# Patient Record
Sex: MALE | Race: WHITE | Employment: OTHER | URBAN - METROPOLITAN AREA
[De-identification: names, ages, dates, MRNs, and addresses within clinical notes are randomized per-mention and may not be internally consistent; named-entity substitution may affect disease eponyms.]

---

## 2021-01-18 ENCOUNTER — HOSPITAL ENCOUNTER (OUTPATIENT)
Dept: ULTRASOUND IMAGING | Age: 66
Discharge: HOME | End: 2021-01-18
Payer: MEDICARE

## 2021-01-18 ENCOUNTER — HOSPITAL ENCOUNTER (OUTPATIENT)
Age: 66
Discharge: HOME | End: 2021-01-18

## 2021-01-18 DIAGNOSIS — Z87.891 HISTORY OF TOBACCO USE: ICD-10-CM

## 2021-01-18 PROCEDURE — 76706 US ABDL AORTA SCREEN AAA: CPT

## 2023-02-02 ENCOUNTER — OFFICE VISIT (OUTPATIENT)
Dept: ENT CLINIC | Age: 68
End: 2023-02-02
Payer: MEDICARE

## 2023-02-02 VITALS
TEMPERATURE: 97.9 F | BODY MASS INDEX: 31.5 KG/M2 | RESPIRATION RATE: 16 BRPM | SYSTOLIC BLOOD PRESSURE: 142 MMHG | HEART RATE: 74 BPM | WEIGHT: 225 LBS | DIASTOLIC BLOOD PRESSURE: 88 MMHG | HEIGHT: 71 IN

## 2023-02-02 DIAGNOSIS — R04.0 EPISTAXIS: Primary | ICD-10-CM

## 2023-02-02 DIAGNOSIS — J34.2 DEVIATED NASAL SEPTUM: ICD-10-CM

## 2023-02-02 PROCEDURE — 3017F COLORECTAL CA SCREEN DOC REV: CPT | Performed by: STUDENT IN AN ORGANIZED HEALTH CARE EDUCATION/TRAINING PROGRAM

## 2023-02-02 PROCEDURE — 99203 OFFICE O/P NEW LOW 30 MIN: CPT | Performed by: STUDENT IN AN ORGANIZED HEALTH CARE EDUCATION/TRAINING PROGRAM

## 2023-02-02 PROCEDURE — 1123F ACP DISCUSS/DSCN MKR DOCD: CPT | Performed by: STUDENT IN AN ORGANIZED HEALTH CARE EDUCATION/TRAINING PROGRAM

## 2023-02-02 PROCEDURE — 31238 NSL/SINS NDSC SRG NSL HEMRRG: CPT | Performed by: STUDENT IN AN ORGANIZED HEALTH CARE EDUCATION/TRAINING PROGRAM

## 2023-02-02 PROCEDURE — 1036F TOBACCO NON-USER: CPT | Performed by: STUDENT IN AN ORGANIZED HEALTH CARE EDUCATION/TRAINING PROGRAM

## 2023-02-02 PROCEDURE — G8427 DOCREV CUR MEDS BY ELIG CLIN: HCPCS | Performed by: STUDENT IN AN ORGANIZED HEALTH CARE EDUCATION/TRAINING PROGRAM

## 2023-02-02 PROCEDURE — G8417 CALC BMI ABV UP PARAM F/U: HCPCS | Performed by: STUDENT IN AN ORGANIZED HEALTH CARE EDUCATION/TRAINING PROGRAM

## 2023-02-02 PROCEDURE — G8484 FLU IMMUNIZE NO ADMIN: HCPCS | Performed by: STUDENT IN AN ORGANIZED HEALTH CARE EDUCATION/TRAINING PROGRAM

## 2023-02-02 RX ORDER — ALLOPURINOL 300 MG/1
TABLET ORAL
COMMUNITY
Start: 2022-12-27

## 2023-02-02 RX ORDER — HYDROXYCHLOROQUINE SULFATE 200 MG/1
TABLET, FILM COATED ORAL
COMMUNITY
Start: 2022-12-27

## 2023-02-02 RX ORDER — LOSARTAN POTASSIUM 50 MG/1
TABLET ORAL
COMMUNITY
Start: 2022-12-26

## 2023-02-02 ASSESSMENT — ENCOUNTER SYMPTOMS
SHORTNESS OF BREATH: 0
NAUSEA: 0
COUGH: 0
VOMITING: 0
EYE PAIN: 0
RHINORRHEA: 0

## 2023-02-02 NOTE — PROGRESS NOTES
[facility]      Patient Name: Jarred Berger  Medical Record Number:  [id number]  Primary Care Physician:  No primary care provider on file. Date of Consultation: 2023    Chief Complaint:   Chief Complaint   Patient presents with    New Patient     Patient is here today for epistaxis. Patient states that he has been getting nosebleeds since December. He states that his last bleed was last night. HISTORY OF PRESENT ILLNESS  Miranda Barfield is a(n) 75 y.o. male who presents for evaluation of epistaxis. He states that he has been having nosebleeds from the right side for 2 months. He has had his nose cauterized at least 4 times in the past.  He denies any other easy bleeding or bruising. He is not on any blood thinners. He did recently begin using Flonase which could be the instigating factor for his nosebleeds. He denies any other ENT symptoms. There is no problem list on file for this patient. Past Surgical History:   Procedure Laterality Date    TONSILLECTOMY       No family history on file.   Social History     Socioeconomic History    Marital status:      Spouse name: Not on file    Number of children: Not on file    Years of education: Not on file    Highest education level: Not on file   Occupational History    Not on file   Tobacco Use    Smoking status: Former     Packs/day: 1.00     Years: 35.00     Pack years: 35.00     Types: Cigarettes     Start date:      Quit date:      Years since quittin.1    Smokeless tobacco: Never   Vaping Use    Vaping Use: Never used   Substance and Sexual Activity    Alcohol use: Yes    Drug use: Never    Sexual activity: Not on file   Other Topics Concern    Not on file   Social History Narrative    Not on file     Social Determinants of Health     Financial Resource Strain: Not on file   Food Insecurity: Not on file   Transportation Needs: Not on file   Physical Activity: Not on file Stress: Not on file   Social Connections: Not on file   Intimate Partner Violence: Not on file   Housing Stability: Not on file       DRUG/FOOD ALLERGIES: Patient has no known allergies. CURRENT MEDICATIONS  Prior to Admission medications    Medication Sig Start Date End Date Taking? Authorizing Provider   allopurinol (ZYLOPRIM) 300 MG tablet TAKE 1 TABLET BY MOUTH EVERY DAY 12/27/22  Yes Historical Provider, MD   hydroxychloroquine (PLAQUENIL) 200 MG tablet TAKE 1 TABLET BY MOUTH TWICE DAILY 12/27/22  Yes Historical Provider, MD   losartan (COZAAR) 50 MG tablet TAKE 1 TABLET BY MOUTH TWICE DAILY 12/26/22  Yes Historical Provider, MD       REVIEW OF SYSTEMS  The following systems were reviewed and revealed the following in addition to any already discussed in the HPI:    Review of Systems   Constitutional:  Negative for fatigue and fever. HENT:  Positive for nosebleeds. Negative for congestion, ear pain, postnasal drip, rhinorrhea and sneezing. Eyes:  Negative for pain and visual disturbance. Respiratory:  Negative for cough and shortness of breath. Cardiovascular:  Negative for chest pain. Gastrointestinal:  Negative for nausea and vomiting. Endocrine: Negative. Genitourinary: Negative. Musculoskeletal:  Negative for neck pain and neck stiffness. Skin:  Negative for rash. Neurological:  Negative for dizziness and headaches.         PHYSICAL EXAM  BP (!) 142/88 (Site: Right Upper Arm, Position: Sitting, Cuff Size: Medium Adult)   Pulse 74   Temp 97.9 °F (36.6 °C) (Infrared)   Resp 16   Ht 5' 11\" (1.803 m)   Wt 225 lb (102.1 kg)   BMI 31.38 kg/m²     GENERAL: No Acute Distress, Alert and Oriented, no hoarseness  EYES: EOMI, Anti-icteric  NOSE: No epistaxis, nasal mucosa within normal limits, no purulent drainage, 3+ deviated septum to the left  EARS: Normal external canal appearance, EAC patent bilaterally  FACE: 1/6 House-Brackmann Scale, symmetric, sensation equal bilaterally  ORAL CAVITY: No masses or lesions palpated, uvula is midline, moist mucous membranes,   NECK: Normal range of motion, no thyromegaly, trachea is midline, no lymphadenopathy, no neck masses, no crepitus  CHEST: Normal respiratory effort, no retractions, breathing comfortably  SKIN: No rashes, normal appearing skin, no evidence of skin lesions/tumors    RADIOLOGY  Summary of findings:    PROCEDURE  Control of Epistaxis    Pre Op: right sided Epistaxis  Post Op: Same    After consent was obtained a cottonoid soaked with lidocaine was placed in the right nasal cavity. After 5 minutes cautery was performed of the right mid septum. Nasal endoscopy was needed to perform the control of bleeding. The bleeding site was not posterior. Hemostasis was obtained. The patient tolerated well. No complications. I attest that I was present for and did the entire procedure myself. ASSESSMENT/PLAN  Masoud Aviles is a very pleasant 75 y.o. male with     1. Epistaxis  Epistaxis has been controlled with silver nitrate and Surgicel placed over the cauterization area. He will place triple antibiotic ointment to the anterior portion of his nose for the next week and use nasal saline spray  - HI NASAL/SINUS NDSC SURG W/CONTROL NASAL HEMRRG    2. Deviated nasal septum  He does have a significantly deviated septum however he is not having issues with breathing so we will observe this is point time. He will follow-up in 2 to 3 weeks or sooner if needed. Medical Decision Making:   The following items were considered in medical decision making:  Independent review of images  Review / order clinical lab tests  Review / order radiology tests  Decision to obtain old records

## 2023-02-14 ENCOUNTER — HOSPITAL ENCOUNTER (OUTPATIENT)
Dept: ULTRASOUND IMAGING | Age: 68
Discharge: HOME | End: 2023-02-14
Payer: MEDICARE

## 2023-02-14 DIAGNOSIS — Z87.891 PERSONAL HISTORY OF TOBACCO USE, PRESENTING HAZARDS TO HEALTH: ICD-10-CM

## 2023-02-14 PROCEDURE — 76706 US ABDL AORTA SCREEN AAA: CPT

## 2023-02-16 ENCOUNTER — OFFICE VISIT (OUTPATIENT)
Dept: ENT CLINIC | Age: 68
End: 2023-02-16
Payer: MEDICARE

## 2023-02-16 VITALS — RESPIRATION RATE: 16 BRPM | WEIGHT: 225 LBS | BODY MASS INDEX: 31.5 KG/M2 | TEMPERATURE: 97.7 F | HEIGHT: 71 IN

## 2023-02-16 DIAGNOSIS — R04.0 EPISTAXIS: Primary | ICD-10-CM

## 2023-02-16 DIAGNOSIS — J34.2 DEVIATED NASAL SEPTUM: ICD-10-CM

## 2023-02-16 PROCEDURE — 1123F ACP DISCUSS/DSCN MKR DOCD: CPT | Performed by: STUDENT IN AN ORGANIZED HEALTH CARE EDUCATION/TRAINING PROGRAM

## 2023-02-16 PROCEDURE — 99213 OFFICE O/P EST LOW 20 MIN: CPT | Performed by: STUDENT IN AN ORGANIZED HEALTH CARE EDUCATION/TRAINING PROGRAM

## 2023-02-16 PROCEDURE — G8484 FLU IMMUNIZE NO ADMIN: HCPCS | Performed by: STUDENT IN AN ORGANIZED HEALTH CARE EDUCATION/TRAINING PROGRAM

## 2023-02-16 PROCEDURE — G8417 CALC BMI ABV UP PARAM F/U: HCPCS | Performed by: STUDENT IN AN ORGANIZED HEALTH CARE EDUCATION/TRAINING PROGRAM

## 2023-02-16 PROCEDURE — G8427 DOCREV CUR MEDS BY ELIG CLIN: HCPCS | Performed by: STUDENT IN AN ORGANIZED HEALTH CARE EDUCATION/TRAINING PROGRAM

## 2023-02-16 PROCEDURE — 3017F COLORECTAL CA SCREEN DOC REV: CPT | Performed by: STUDENT IN AN ORGANIZED HEALTH CARE EDUCATION/TRAINING PROGRAM

## 2023-02-16 PROCEDURE — 1036F TOBACCO NON-USER: CPT | Performed by: STUDENT IN AN ORGANIZED HEALTH CARE EDUCATION/TRAINING PROGRAM

## 2023-02-16 ASSESSMENT — ENCOUNTER SYMPTOMS
SHORTNESS OF BREATH: 0
VOMITING: 0
EYE PAIN: 0
NAUSEA: 0
COUGH: 0
RHINORRHEA: 0

## 2023-02-16 NOTE — PROGRESS NOTES
[facility]  DIVISION OF OTOLARYNGOLOGY- HEAD & NECK SURGERY  CONSULT      Patient Name: Jay Starr  Medical Record Number:  [id number]  Primary Care Physician:  No primary care provider on file.  Date of Consultation: 2023    Chief Complaint:   Chief Complaint   Patient presents with    Follow-up     States that his nose has been fine since last appointment        HISTORY OF PRESENT ILLNESS  Jay is a(n) 67 y.o. male who presents for evaluation of epistaxis.  He states that he has been having nosebleeds from the right side for 2 months.  He has had his nose cauterized at least 4 times in the past.  He denies any other easy bleeding or bruising.  He is not on any blood thinners.  He did recently begin using Flonase which could be the instigating factor for his nosebleeds.  He denies any other ENT symptoms.    Interval history 2023  Jay has not had any further bleeding since cauterization was performed last week.  He has been doing well.    There is no problem list on file for this patient.    Past Surgical History:   Procedure Laterality Date    TONSILLECTOMY       No family history on file.  Social History     Socioeconomic History    Marital status:      Spouse name: Not on file    Number of children: Not on file    Years of education: Not on file    Highest education level: Not on file   Occupational History    Not on file   Tobacco Use    Smoking status: Former     Packs/day: 1.00     Years: 35.00     Pack years: 35.00     Types: Cigarettes     Start date:      Quit date:      Years since quittin.1    Smokeless tobacco: Never   Vaping Use    Vaping Use: Never used   Substance and Sexual Activity    Alcohol use: Yes    Drug use: Never    Sexual activity: Not on file   Other Topics Concern    Not on file   Social History Narrative    Not on file     Social Determinants of Health     Financial Resource Strain: Not on file   Food Insecurity: Not on file   Transportation Needs: Not  on file   Physical Activity: Not on file   Stress: Not on file   Social Connections: Not on file   Intimate Partner Violence: Not on file   Housing Stability: Not on file     DRUG/FOOD ALLERGIES: Patient has no known allergies. CURRENT MEDICATIONS  Prior to Admission medications    Medication Sig Start Date End Date Taking? Authorizing Provider   allopurinol (ZYLOPRIM) 300 MG tablet TAKE 1 TABLET BY MOUTH EVERY DAY 12/27/22  Yes Historical Provider, MD   hydroxychloroquine (PLAQUENIL) 200 MG tablet TAKE 1 TABLET BY MOUTH TWICE DAILY 12/27/22  Yes Historical Provider, MD   losartan (COZAAR) 50 MG tablet TAKE 1 TABLET BY MOUTH TWICE DAILY 12/26/22  Yes Historical Provider, MD     REVIEW OF SYSTEMS  The following systems were reviewed and revealed the following in addition to any already discussed in the HPI:    Review of Systems   Constitutional:  Negative for fatigue and fever. HENT:  Negative for congestion, ear pain, nosebleeds, postnasal drip, rhinorrhea and sneezing. Eyes:  Negative for pain and visual disturbance. Respiratory:  Negative for cough and shortness of breath. Cardiovascular:  Negative for chest pain. Gastrointestinal:  Negative for nausea and vomiting. Endocrine: Negative. Genitourinary: Negative. Musculoskeletal:  Negative for neck pain and neck stiffness. Skin:  Negative for rash. Neurological:  Negative for dizziness and headaches.       PHYSICAL EXAM  Temp 97.7 °F (36.5 °C) (Infrared)   Resp 16   Ht 5' 11\" (1.803 m)   Wt 225 lb (102.1 kg)   BMI 31.38 kg/m²     GENERAL: No Acute Distress, Alert and Oriented, no hoarseness  EYES: EOMI, Anti-icteric  NOSE: No epistaxis, nasal mucosa within normal limits, no purulent drainage, 3+ deviated septum to the left  EARS: Normal external canal appearance, EAC patent bilaterally  FACE: 1/6 House-Brackmann Scale, symmetric, sensation equal bilaterally  ORAL CAVITY: No masses or lesions palpated, uvula is midline, moist mucous membranes,   NECK: Normal range of motion, no thyromegaly, trachea is midline, no lymphadenopathy, no neck masses, no crepitus  CHEST: Normal respiratory effort, no retractions, breathing comfortably  SKIN: No rashes, normal appearing skin, no evidence of skin lesions/tumors    RADIOLOGY  Summary of findings:    PROCEDURE    ASSESSMENT/PLAN  Jared Dela Cruz is a very pleasant 75 y.o. male with     1. Epistaxis  Epistaxis has resolved. He will start placing hydration to the nose especially during the winter months. The septum has healed well. 2. Deviated nasal septum  He does have a deviated nasal septum to the left. We could fix this surgically however it does not seem to bother him significantly. He will think about his options and let me know if he does change his mind. He will follow-up with me as needed. Medical Decision Making:   The following items were considered in medical decision making:  Independent review of images  Review / order clinical lab tests  Review / order radiology tests  Decision to obtain old records

## 2024-11-26 ENCOUNTER — TRANSCRIBE ORDERS (OUTPATIENT)
Dept: ADMINISTRATIVE | Age: 69
End: 2024-11-26

## 2024-11-26 DIAGNOSIS — R63.4 UNINTENTIONAL WEIGHT LOSS: Primary | ICD-10-CM

## 2024-11-27 ENCOUNTER — HOSPITAL ENCOUNTER (OUTPATIENT)
Dept: CT IMAGING | Age: 69
Discharge: HOME | End: 2024-11-27
Payer: MEDICARE

## 2024-11-27 DIAGNOSIS — R63.4 UNINTENTIONAL WEIGHT LOSS: ICD-10-CM

## 2024-11-27 LAB
PERFORMED ON: ABNORMAL
POC CREATININE: 0.7 MG/DL (ref 0.8–1.3)
POC SAMPLE TYPE: ABNORMAL

## 2024-11-27 PROCEDURE — 74177 CT ABD & PELVIS W/CONTRAST: CPT

## 2024-11-27 PROCEDURE — 6360000004 HC RX CONTRAST MEDICATION: Performed by: FAMILY MEDICINE

## 2024-11-27 PROCEDURE — 82565 ASSAY OF CREATININE: CPT

## 2024-11-27 RX ORDER — DIATRIZOATE MEGLUMINE AND DIATRIZOATE SODIUM 660; 100 MG/ML; MG/ML
12 SOLUTION ORAL; RECTAL
Status: DISCONTINUED | OUTPATIENT
Start: 2024-11-27 | End: 2024-11-28 | Stop reason: HOSPADM

## 2024-11-27 RX ORDER — IOPAMIDOL 755 MG/ML
75 INJECTION, SOLUTION INTRAVASCULAR
Status: COMPLETED | OUTPATIENT
Start: 2024-11-27 | End: 2024-11-27

## 2024-11-27 RX ADMIN — DIATRIZOATE MEGLUMINE AND DIATRIZOATE SODIUM 12 ML: 660; 100 LIQUID ORAL; RECTAL at 14:23

## 2024-11-27 RX ADMIN — IOPAMIDOL 75 ML: 755 INJECTION, SOLUTION INTRAVENOUS at 14:23

## 2024-12-03 ENCOUNTER — TELEPHONE (OUTPATIENT)
Dept: INTERVENTIONAL RADIOLOGY/VASCULAR | Age: 69
End: 2024-12-03

## 2024-12-03 NOTE — TELEPHONE ENCOUNTER
Attempted to call patient to schedule procedure. No answer left message for patient to call back. Number used [phone]    Procedure: liver biopsy lesion   Approving Radiologist: Peterson

## 2024-12-04 ENCOUNTER — TELEPHONE (OUTPATIENT)
Dept: INTERVENTIONAL RADIOLOGY/VASCULAR | Age: 69
End: 2024-12-04

## 2024-12-04 NOTE — TELEPHONE ENCOUNTER
Called and spoke to Ludivina about upcoming procedure. Phone number used: [phone]  Procedure: liver biopsy  Approving Radiologist: Peterson Sparks informed of the following:  Date of procedure: 12/11/24  Arrival time of procedure: 0830  Time of procedure: 02072    On any blood thinners?No. If yes:   Instructed to hold thinners for  days prior to test    On any GLP-1 Agonists? ( Ozempic, Jardiance, Semaglutide) No. Instructed to hold for  days.     Blood pressure medication? Yes. If yes need to make sure take morning of procedure.     Any issues with sedation in the past? no  Will receive versed and fentanyl for sedation will need a  day of procedure.     H&P or office note within 30 days? yes - 12/3/24    After procedure will be here 2-4 hours after procedure for monitoring.     Nothing to eat or drink at midnight.     Need COVID testing prior: No    Need SDS: Yes

## 2024-12-11 ENCOUNTER — HOSPITAL ENCOUNTER (OUTPATIENT)
Dept: CT IMAGING | Age: 69
Discharge: HOME | End: 2024-12-11
Attending: INTERNAL MEDICINE
Payer: MEDICARE

## 2024-12-11 VITALS
SYSTOLIC BLOOD PRESSURE: 155 MMHG | OXYGEN SATURATION: 96 % | DIASTOLIC BLOOD PRESSURE: 78 MMHG | HEART RATE: 74 BPM | TEMPERATURE: 98.1 F | RESPIRATION RATE: 16 BRPM

## 2024-12-11 DIAGNOSIS — K76.9 LIVER LESION: ICD-10-CM

## 2024-12-11 LAB
ANION GAP SERPL CALCULATED.3IONS-SCNC: 13 MMOL/L (ref 3–16)
BUN SERPL-MCNC: 20 MG/DL (ref 7–20)
CALCIUM SERPL-MCNC: 9.7 MG/DL (ref 8.3–10.6)
CHLORIDE SERPL-SCNC: 102 MMOL/L (ref 99–110)
CO2 SERPL-SCNC: 24 MMOL/L (ref 21–32)
CREAT SERPL-MCNC: 0.7 MG/DL (ref 0.8–1.3)
DEPRECATED RDW RBC AUTO: 16.5 % (ref 12.4–15.4)
GFR SERPLBLD CREATININE-BSD FMLA CKD-EPI: >90 ML/MIN/{1.73_M2}
GLUCOSE SERPL-MCNC: 106 MG/DL (ref 70–99)
HCT VFR BLD AUTO: 31.6 % (ref 40.5–52.5)
HGB BLD-MCNC: 10.3 G/DL (ref 13.5–17.5)
INR PPP: 1.12 (ref 0.85–1.15)
MCH RBC QN AUTO: 30.3 PG (ref 26–34)
MCHC RBC AUTO-ENTMCNC: 32.4 G/DL (ref 31–36)
MCV RBC AUTO: 93.5 FL (ref 80–100)
PLATELET # BLD AUTO: 504 K/UL (ref 135–450)
PMV BLD AUTO: 7.8 FL (ref 5–10.5)
POTASSIUM SERPL-SCNC: 5 MMOL/L (ref 3.5–5.1)
PROTHROMBIN TIME: 14.6 SEC (ref 11.9–14.9)
RBC # BLD AUTO: 3.38 M/UL (ref 4.2–5.9)
SODIUM SERPL-SCNC: 139 MMOL/L (ref 136–145)
WBC # BLD AUTO: 7.9 K/UL (ref 4–11)

## 2024-12-11 PROCEDURE — 88307 TISSUE EXAM BY PATHOLOGIST: CPT

## 2024-12-11 PROCEDURE — 36415 COLL VENOUS BLD VENIPUNCTURE: CPT

## 2024-12-11 PROCEDURE — 85027 COMPLETE CBC AUTOMATED: CPT

## 2024-12-11 PROCEDURE — 85610 PROTHROMBIN TIME: CPT

## 2024-12-11 PROCEDURE — 80048 BASIC METABOLIC PNL TOTAL CA: CPT

## 2024-12-11 PROCEDURE — 99153 MOD SED SAME PHYS/QHP EA: CPT

## 2024-12-11 PROCEDURE — 6360000002 HC RX W HCPCS: Performed by: RADIOLOGY

## 2024-12-11 PROCEDURE — 88341 IMHCHEM/IMCYTCHM EA ADD ANTB: CPT

## 2024-12-11 PROCEDURE — 88333 PATH CONSLTJ SURG CYTO XM 1: CPT

## 2024-12-11 PROCEDURE — 88313 SPECIAL STAINS GROUP 2: CPT

## 2024-12-11 PROCEDURE — 88334 PATH CONSLTJ SURG CYTO XM EA: CPT

## 2024-12-11 PROCEDURE — 88342 IMHCHEM/IMCYTCHM 1ST ANTB: CPT

## 2024-12-11 RX ORDER — SODIUM CHLORIDE 0.9 % (FLUSH) 0.9 %
5-40 SYRINGE (ML) INJECTION PRN
Status: DISCONTINUED | OUTPATIENT
Start: 2024-12-11 | End: 2024-12-12 | Stop reason: HOSPADM

## 2024-12-11 RX ORDER — SODIUM CHLORIDE 0.9 % (FLUSH) 0.9 %
5-40 SYRINGE (ML) INJECTION EVERY 12 HOURS SCHEDULED
Status: DISCONTINUED | OUTPATIENT
Start: 2024-12-11 | End: 2024-12-12 | Stop reason: HOSPADM

## 2024-12-11 RX ORDER — FENTANYL CITRATE 50 UG/ML
INJECTION, SOLUTION INTRAMUSCULAR; INTRAVENOUS PRN
Status: COMPLETED | OUTPATIENT
Start: 2024-12-11 | End: 2024-12-11

## 2024-12-11 RX ORDER — MIDAZOLAM HYDROCHLORIDE 1 MG/ML
INJECTION, SOLUTION INTRAMUSCULAR; INTRAVENOUS PRN
Status: COMPLETED | OUTPATIENT
Start: 2024-12-11 | End: 2024-12-11

## 2024-12-11 RX ORDER — ONDANSETRON 2 MG/ML
4 INJECTION INTRAMUSCULAR; INTRAVENOUS EVERY 8 HOURS PRN
Status: DISCONTINUED | OUTPATIENT
Start: 2024-12-11 | End: 2024-12-12 | Stop reason: HOSPADM

## 2024-12-11 RX ORDER — SODIUM CHLORIDE 9 MG/ML
INJECTION, SOLUTION INTRAVENOUS PRN
Status: DISCONTINUED | OUTPATIENT
Start: 2024-12-11 | End: 2024-12-12 | Stop reason: HOSPADM

## 2024-12-11 RX ADMIN — FENTANYL CITRATE 50 MCG: 50 INJECTION, SOLUTION INTRAMUSCULAR; INTRAVENOUS at 12:05

## 2024-12-11 RX ADMIN — MIDAZOLAM 1 MG: 1 INJECTION INTRAMUSCULAR; INTRAVENOUS at 12:05

## 2024-12-11 RX ADMIN — MIDAZOLAM 1 MG: 1 INJECTION INTRAMUSCULAR; INTRAVENOUS at 12:27

## 2024-12-11 RX ADMIN — FENTANYL CITRATE 50 MCG: 50 INJECTION, SOLUTION INTRAMUSCULAR; INTRAVENOUS at 12:27

## 2024-12-11 ASSESSMENT — PAIN SCALES - GENERAL: PAINLEVEL_OUTOF10: 0

## 2024-12-11 ASSESSMENT — PAIN - FUNCTIONAL ASSESSMENT: PAIN_FUNCTIONAL_ASSESSMENT: 0-10

## 2024-12-11 NOTE — OR NURSING
Image guided liver biopsy biopsy completed by Dr. Winkler. Pt tolerated procedure without any signs or symptoms of distress. Vital signs stable. Report given  to hospitals RN. Pt transported back to hospitals in stable condition via stretcher.     Total Biopsy: 5  Received: Versed: 2 mg       Fentanyl: 100 mcg  Bandage to right side that is clean dry and intact.   Patient to lay on right side for 1 hour.     Vital Signs  Vitals:    12/11/24 1243   BP:    Pulse: 78   Resp: 16   Temp:    SpO2: 96%    (vital signs in table format)    Post Robbie  2 - Able to move 4 extremities voluntarily on command  2 - BP+/- 20mmHg of normal  2 - Fully awake  2 - Able to maintain oxygen saturation >92% on room air  2 - Able to breathe deeply and cough freely

## 2024-12-11 NOTE — OR NURSING
Pt arrived for image guided liver biopsy right. Procedure explained including the risk and benefits of the procedure. All questions answered. Pt verbalizes understanding of the of procedure and states no more questions. Consent signed. Vital signs stable, labs, allergies, medications, and code status reviewed. No contraindications noted.     Vitals:    12/11/24 1025   BP: (!) 143/76   Pulse: 78   Resp: 18   Temp: 98.5 °F (36.9 °C)   SpO2: 97%    (vital signs in table format)    Robbie Score  2 - Able to move 4 extremities voluntarily on command  2 - BP+/- 20mmHg of normal  2 - Fully awake  2 - Able to maintain oxygen saturation >92% on room air  2 - Able to breathe deeply and cough freely    Allergies  Patient has no known allergies.    Labs  Lab Results   Component Value Date    INR 1.12 12/11/2024    PROTIME 14.6 12/11/2024       Lab Results   Component Value Date    CREATININE 0.7 (L) 12/11/2024    BUN 20 12/11/2024     12/11/2024    K 5.0 12/11/2024     12/11/2024    CO2 24 12/11/2024       Lab Results   Component Value Date    WBC 7.9 12/11/2024    HGB 10.3 (L) 12/11/2024    HCT 31.6 (L) 12/11/2024    MCV 93.5 12/11/2024     (H) 12/11/2024

## 2024-12-11 NOTE — PROGRESS NOTES
Discharge instructions given to patient and patient's wife, verbalized understanding. Patient tolerating PO intake. No pain, no nausea. No emesis. Handoff given to HERB Galindo.

## 2024-12-11 NOTE — PRE SEDATION
Sedation Pre-Procedure Note    Patient Name: Jay Starr   YOB: 1955  Room/Bed: Room/bed info not found  Medical Record Number: [id number]  Date: 12/11/2024   Time: 12:52 PM       Indication:  Liver Lesion Biopsy    Consent: I have discussed with the patient and/or the patient representative the indication, alternatives, and the possible risks and/or complications of the planned procedure and the anesthesia methods. The patient and/or patient representative appear to understand and agree to proceed.    Vital Signs:   Vitals:    12/11/24 1243   BP: (!) 151/70   Pulse: 78   Resp: 16   Temp:    SpO2: 96%       Past Medical History:   has a past medical history of Gout, Hypertension, and Nosebleed.    Past Surgical History:   has a past surgical history that includes Tonsillectomy.    Medications:   Scheduled Meds:    sodium chloride flush  5-40 mL IntraVENous 2 times per day     Continuous Infusions:    sodium chloride       PRN Meds: sodium chloride flush, sodium chloride, ondansetron  Home Meds:   Prior to Admission medications    Medication Sig Start Date End Date Taking? Authorizing Provider   allopurinol (ZYLOPRIM) 300 MG tablet TAKE 1 TABLET BY MOUTH EVERY DAY 12/27/22  Yes Karina Johnson MD   hydroxychloroquine (PLAQUENIL) 200 MG tablet TAKE 1 TABLET BY MOUTH TWICE DAILY 12/27/22  Yes Karina Johnson MD   losartan (COZAAR) 50 MG tablet TAKE 1 TABLET BY MOUTH TWICE DAILY 12/26/22  Yes Karina Johnson MD     Coumadin Use Last 7 Days:  no  Antiplatelet drug therapy use last 7 days: no  Other anticoagulant use last 7 days: no  Additional Medication Information:        Pre-Sedation Documentation and Exam:   I have reviewed the patient's history and review of systems.    Mallampati Airway Assessment:  Mallampati Class II - (soft palate, fauces & uvula are visible)    Prior History of Anesthesia Complications:   none    ASA Classification:  Class 2 - A normal healthy patient with mild

## 2024-12-11 NOTE — PROGRESS NOTES
Patient arrived to [facility], phase two initiated. Placed on bedside monitor, VSS. Report obtained from IR RN. Patient is alert. Stated no pain, no nausea. No emesis. Side rails in place. Wife at bedside.

## 2024-12-11 NOTE — BRIEF OP NOTE
Brief Postoperative Note      Patient: Jay Starr  YOB: 1955  MRN: [id number]    Date of Procedure: 12/11/2024    Liver Lesion    Post-Op Diagnosis: Same       CT Guided Liver Lesion Biopsy    Surgeon(s):  Adrian Winkler MD    Anesthesia: Moderate Sedation    Estimated Blood Loss (mL): Minimal    Complications: None    Specimens:   5 x 18G core biopsies      Findings:  Successful right hepatic lesion biopsy.    Electronically signed by Adrian Winkler MD on 12/11/2024 at 12:52 PM

## 2025-01-02 ENCOUNTER — HOSPITAL ENCOUNTER (OUTPATIENT)
Dept: CT IMAGING | Age: 70
Discharge: HOME | End: 2025-01-02
Attending: INTERNAL MEDICINE
Payer: MEDICARE

## 2025-01-02 ENCOUNTER — HOSPITAL ENCOUNTER (OUTPATIENT)
Dept: MRI IMAGING | Age: 70
Discharge: HOME | End: 2025-01-02
Attending: INTERNAL MEDICINE
Payer: MEDICARE

## 2025-01-02 DIAGNOSIS — C22.0 HEPATOCELLULAR CARCINOMA (HCC): ICD-10-CM

## 2025-01-02 DIAGNOSIS — D75.839 THROMBOCYTOSIS, UNSPECIFIED: ICD-10-CM

## 2025-01-02 PROCEDURE — A9579 GAD-BASE MR CONTRAST NOS,1ML: HCPCS | Performed by: INTERNAL MEDICINE

## 2025-01-02 PROCEDURE — 6360000004 HC RX CONTRAST MEDICATION: Performed by: INTERNAL MEDICINE

## 2025-01-02 PROCEDURE — 71260 CT THORAX DX C+: CPT

## 2025-01-02 PROCEDURE — 74183 MRI ABD W/O CNTR FLWD CNTR: CPT

## 2025-01-02 RX ORDER — IOPAMIDOL 755 MG/ML
75 INJECTION, SOLUTION INTRAVASCULAR
Status: COMPLETED | OUTPATIENT
Start: 2025-01-02 | End: 2025-01-02

## 2025-01-02 RX ADMIN — GADOTERIDOL 20 ML: 279.3 INJECTION, SOLUTION INTRAVENOUS at 08:50

## 2025-01-02 RX ADMIN — IOPAMIDOL 75 ML: 755 INJECTION, SOLUTION INTRAVENOUS at 07:10
